# Patient Record
Sex: MALE | Race: WHITE | NOT HISPANIC OR LATINO | Employment: FULL TIME | URBAN - NONMETROPOLITAN AREA
[De-identification: names, ages, dates, MRNs, and addresses within clinical notes are randomized per-mention and may not be internally consistent; named-entity substitution may affect disease eponyms.]

---

## 2022-02-15 ENCOUNTER — OFFICE VISIT (OUTPATIENT)
Dept: FAMILY MEDICINE CLINIC | Facility: CLINIC | Age: 22
End: 2022-02-15

## 2022-02-15 VITALS
BODY MASS INDEX: 40.32 KG/M2 | RESPIRATION RATE: 16 BRPM | DIASTOLIC BLOOD PRESSURE: 88 MMHG | OXYGEN SATURATION: 99 % | SYSTOLIC BLOOD PRESSURE: 138 MMHG | WEIGHT: 266 LBS | HEIGHT: 68 IN | HEART RATE: 87 BPM | TEMPERATURE: 97.6 F

## 2022-02-15 DIAGNOSIS — K52.9 CHRONIC DIARRHEA: ICD-10-CM

## 2022-02-15 DIAGNOSIS — R63.5 WEIGHT GAIN: ICD-10-CM

## 2022-02-15 DIAGNOSIS — E78.5 DYSLIPIDEMIA: ICD-10-CM

## 2022-02-15 DIAGNOSIS — F17.200 SMOKER: ICD-10-CM

## 2022-02-15 DIAGNOSIS — A64 STD (MALE): Primary | ICD-10-CM

## 2022-02-15 DIAGNOSIS — Z23 IMMUNIZATION DUE: ICD-10-CM

## 2022-02-15 DIAGNOSIS — R03.0 ELEVATED BLOOD PRESSURE READING: ICD-10-CM

## 2022-02-15 PROCEDURE — 99214 OFFICE O/P EST MOD 30 MIN: CPT | Performed by: FAMILY MEDICINE

## 2022-02-15 PROCEDURE — 87340 HEPATITIS B SURFACE AG IA: CPT | Performed by: FAMILY MEDICINE

## 2022-02-15 PROCEDURE — 84443 ASSAY THYROID STIM HORMONE: CPT | Performed by: FAMILY MEDICINE

## 2022-02-15 PROCEDURE — 86592 SYPHILIS TEST NON-TREP QUAL: CPT | Performed by: FAMILY MEDICINE

## 2022-02-15 PROCEDURE — 36415 COLL VENOUS BLD VENIPUNCTURE: CPT | Performed by: FAMILY MEDICINE

## 2022-02-15 PROCEDURE — G0432 EIA HIV-1/HIV-2 SCREEN: HCPCS | Performed by: FAMILY MEDICINE

## 2022-02-15 PROCEDURE — 86803 HEPATITIS C AB TEST: CPT | Performed by: FAMILY MEDICINE

## 2022-02-15 PROCEDURE — 85007 BL SMEAR W/DIFF WBC COUNT: CPT | Performed by: FAMILY MEDICINE

## 2022-02-15 PROCEDURE — 84439 ASSAY OF FREE THYROXINE: CPT | Performed by: FAMILY MEDICINE

## 2022-02-15 PROCEDURE — 80061 LIPID PANEL: CPT | Performed by: FAMILY MEDICINE

## 2022-02-15 PROCEDURE — 85025 COMPLETE CBC W/AUTO DIFF WBC: CPT | Performed by: FAMILY MEDICINE

## 2022-02-15 PROCEDURE — 80053 COMPREHEN METABOLIC PANEL: CPT | Performed by: FAMILY MEDICINE

## 2022-02-15 RX ORDER — ACYCLOVIR 400 MG/1
400 TABLET ORAL 3 TIMES DAILY
COMMUNITY
End: 2022-12-16

## 2022-02-15 NOTE — PROGRESS NOTES
Venipuncture Blood Specimen Collection  Venipuncture performed in [facility] by Eda Vivar CMA with good hemostasis. Patient tolerated the procedure well without complications.   02/15/22   Eda Vivar CMA

## 2022-02-16 LAB
ALBUMIN SERPL-MCNC: 4.9 G/DL (ref 3.5–5.2)
ALBUMIN/GLOB SERPL: 1.6 G/DL
ALP SERPL-CCNC: 66 U/L (ref 39–117)
ALT SERPL W P-5'-P-CCNC: 34 U/L (ref 1–41)
ANION GAP SERPL CALCULATED.3IONS-SCNC: 8 MMOL/L (ref 5–15)
AST SERPL-CCNC: 20 U/L (ref 1–40)
BASOPHILS # BLD AUTO: 0.05 10*3/MM3 (ref 0–0.2)
BASOPHILS NFR BLD AUTO: 0.6 % (ref 0–1.5)
BILIRUB SERPL-MCNC: 0.4 MG/DL (ref 0–1.2)
BUN SERPL-MCNC: 18 MG/DL (ref 6–20)
BUN/CREAT SERPL: 20.9 (ref 7–25)
CALCIUM SPEC-SCNC: 9.6 MG/DL (ref 8.6–10.5)
CHLORIDE SERPL-SCNC: 103 MMOL/L (ref 98–107)
CHOLEST SERPL-MCNC: 174 MG/DL (ref 0–200)
CLUMPED PLATELETS: PRESENT
CO2 SERPL-SCNC: 26 MMOL/L (ref 22–29)
CREAT SERPL-MCNC: 0.86 MG/DL (ref 0.76–1.27)
DEPRECATED RDW RBC AUTO: 42.4 FL (ref 37–54)
EOSINOPHIL # BLD AUTO: 0.11 10*3/MM3 (ref 0–0.4)
EOSINOPHIL NFR BLD AUTO: 1.2 % (ref 0.3–6.2)
ERYTHROCYTE [DISTWIDTH] IN BLOOD BY AUTOMATED COUNT: 13.2 % (ref 12.3–15.4)
GFR SERPL CREATININE-BSD FRML MDRD: 112 ML/MIN/1.73
GLOBULIN UR ELPH-MCNC: 3.1 GM/DL
GLUCOSE SERPL-MCNC: 70 MG/DL (ref 65–99)
HBV SURFACE AG SERPL QL IA: NORMAL
HCT VFR BLD AUTO: 49.1 % (ref 37.5–51)
HCV AB SER DONR QL: NORMAL
HDLC SERPL-MCNC: 31 MG/DL (ref 40–60)
HGB BLD-MCNC: 17 G/DL (ref 13–17.7)
HIV1+2 AB SER QL: NORMAL
IMM GRANULOCYTES # BLD AUTO: 0.05 10*3/MM3 (ref 0–0.05)
IMM GRANULOCYTES NFR BLD AUTO: 0.6 % (ref 0–0.5)
LDLC SERPL CALC-MCNC: 93 MG/DL (ref 0–100)
LDLC/HDLC SERPL: 2.7 {RATIO}
LYMPHOCYTES # BLD AUTO: 2.28 10*3/MM3 (ref 0.7–3.1)
LYMPHOCYTES NFR BLD AUTO: 25.2 % (ref 19.6–45.3)
MCH RBC QN AUTO: 31 PG (ref 26.6–33)
MCHC RBC AUTO-ENTMCNC: 34.6 G/DL (ref 31.5–35.7)
MCV RBC AUTO: 89.4 FL (ref 79–97)
MONOCYTES # BLD AUTO: 0.58 10*3/MM3 (ref 0.1–0.9)
MONOCYTES NFR BLD AUTO: 6.4 % (ref 5–12)
NEUTROPHILS NFR BLD AUTO: 5.99 10*3/MM3 (ref 1.7–7)
NEUTROPHILS NFR BLD AUTO: 66 % (ref 42.7–76)
NRBC BLD AUTO-RTO: 0 /100 WBC (ref 0–0.2)
PLATELET # BLD AUTO: 229 10*3/MM3 (ref 140–450)
PMV BLD AUTO: 11 FL (ref 6–12)
POTASSIUM SERPL-SCNC: 4.1 MMOL/L (ref 3.5–5.2)
PROT SERPL-MCNC: 8 G/DL (ref 6–8.5)
RBC # BLD AUTO: 5.49 10*6/MM3 (ref 4.14–5.8)
RBC MORPH BLD: NORMAL
SMALL PLATELETS BLD QL SMEAR: ADEQUATE
SODIUM SERPL-SCNC: 137 MMOL/L (ref 136–145)
T4 FREE SERPL-MCNC: 1.31 NG/DL (ref 0.93–1.7)
TRIGL SERPL-MCNC: 296 MG/DL (ref 0–150)
TSH SERPL DL<=0.05 MIU/L-ACNC: 2.05 UIU/ML (ref 0.27–4.2)
VLDLC SERPL-MCNC: 50 MG/DL (ref 5–40)
WBC MORPH BLD: NORMAL
WBC NRBC COR # BLD: 9.06 10*3/MM3 (ref 3.4–10.8)

## 2022-02-16 NOTE — PROGRESS NOTES
Subjective   Humberto Nettles is a 21 y.o. male.     Chief Complaint   Patient presents with   • Exposure to STD     lab follow up, genitals still arent cleared up    • Blood Pressure Check     follow up from urgent care        History of Present Illness   The patient presents today for an STD screening and blood pressure check.   No prior PCP.   Due for flu vaccine.     Was previously seen at [facility] in [place].  STD panel there was negative but did not check syphilis or HIV.  Spot on genitalia is improving.    Complains of diarrhea every time he eats.  Ongoing for several years.   No history of cholecystectomy.     Smoker due to stress.   History of cardiovascular disease in family.     bp better today. Weight loss recommended as well as low sodium diet    The following portions of the patient's history were reviewed and updated as appropriate: allergies, current medications, past family history, past medical history, past social history, past surgical history and problem list.    Past Medical History:   Diagnosis Date   • Asthma    • Headache    • HL (hearing loss)    • Irritable bowel syndrome        History reviewed. No pertinent surgical history.    History reviewed. No pertinent family history.    Review of Systems  All system were reviewed are are negative otherwise what is listed in the HPI.    Objective   Physical Exam  General Appearance: alert, oriented x 3, no acute distress.  Skin: warm and dry.  HEENT: Atraumatic. pupils round and reactive to light and accommodation, oral mucosa pink and moist. Nares patent without epistaxis. External auditory canals are patent tympanic membranes intact.  Neck: supple, no JVD, trachea midline. No thyromegaly  Lungs: CTA, unlabored breathing effort.  Heart: RRR, normal S1 and S2, no S3, no rub.  Abdomen: soft, non-tender, no palpable bladder, present bowel sounds to auscultation ×4. No guarding or rigidity.  Extremities: no clubbing, cyanosis or edema.  Good range of motion actively and passively. Symmetric muscle strength and development  Neuro: normal speech and mental status. Cranial nerves II through XII intact. No anosmia. DTR 2+; proprioception intact. No focal motor/sensory deficits.    Vitals:    02/15/22 1536   BP: 138/88   Pulse: 87   Resp: 16   Temp: 97.6 °F (36.4 °C)   SpO2: 99%     Body mass index is 40.45 kg/m².    No results found for: HGBA1C, POCGLU, LDL, CBC, CMP, TSH  Results for orders placed or performed during the hospital encounter of 01/31/22   POC Urinalysis Dipstick, Multipro (Automated dipstick)    Specimen: Urine   Result Value Ref Range    Color Dark Yellow Yellow, Straw, Dark Yellow, Bianka    Clarity, UA Clear Clear    Glucose, UA Negative Negative, 1000 mg/dL (3+) mg/dL    Bilirubin Small (1+) (A) Negative    Ketones, UA Negative Negative    Specific Gravity  1.030 1.005 - 1.030    Blood, UA Negative Negative    pH, Urine 5.5 5.0 - 8.0    Protein, POC 30 mg/dL (A) Negative mg/dL    Urobilinogen, UA Normal Normal    Nitrite, UA Negative Negative    Leukocytes Negative Negative   Chlamydia trachomatis, Neisseria gonorrhoeae, Trichomonas vaginalis, PCR - Urine, Urine, Clean Catch    Specimen: Urine, Clean Catch   Result Value Ref Range    Chlamydia trachomatis, BARBY Negative Negative    Gonococcus by BARBY Negative Negative    Trichomonas vaginosis Negative Negative   HSV 1 & 2 - Specific Antibody, IgG    Specimen: Blood   Result Value Ref Range    HSV 1 IgG, Type Specific <0.91 0.00 - 0.90 index    HSV 2 IgG <0.91 0.00 - 0.90 index   HSV Non-Specific Antibody, IgM    Specimen: Blood   Result Value Ref Range    HSVI/II IgM <0.91 0.00 - 0.90 Ratio       Assessment/Plan   Diagnoses and all orders for this visit:    1. STD (male) (Primary)  -     RPR, Rfx Qn RPR / Confirm TP  -     Hepatitis B Surface Antigen  -     Hepatitis C Antibody  -     HIV-1 / O / 2 Ag / Antibody 4th Generation    2. Chronic diarrhea  -     CBC & Differential  -      Comprehensive metabolic panel; Future  -     Comprehensive metabolic panel    3. Elevated blood pressure reading  -     Comprehensive metabolic panel; Future  -     Comprehensive metabolic panel    4. Dyslipidemia  -     Lipid Panel; Future  -     Lipid Panel    5. Smoker  -     Lipid Panel; Future  -     Lipid Panel    6. Weight gain  -     TSH+Free T4    7. Immunization due  -     FluLaval/Fluarix/Fluzone >6 Months (1625-7882)      1. STD  Lab work ordered.     2. Chronic diarrhea  Lab work ordered.     5. Smoking  Advised cessation.        Transcribed from ambient dictation for Lili Castro MD by Janey Somers.  02/16/22   14:50 EST

## 2022-02-17 LAB — RPR SER QL: NON REACTIVE

## 2022-11-18 ENCOUNTER — OFFICE VISIT (OUTPATIENT)
Dept: FAMILY MEDICINE CLINIC | Facility: CLINIC | Age: 22
End: 2022-11-18
Payer: COMMERCIAL

## 2022-11-18 VITALS
OXYGEN SATURATION: 98 % | DIASTOLIC BLOOD PRESSURE: 82 MMHG | WEIGHT: 257.2 LBS | SYSTOLIC BLOOD PRESSURE: 132 MMHG | BODY MASS INDEX: 38.98 KG/M2 | TEMPERATURE: 98.6 F | HEART RATE: 86 BPM | HEIGHT: 68 IN

## 2022-11-18 DIAGNOSIS — Z13.220 SCREENING FOR LIPID DISORDERS: ICD-10-CM

## 2022-11-18 DIAGNOSIS — R10.84 GENERALIZED ABDOMINAL PAIN: Primary | ICD-10-CM

## 2022-11-18 DIAGNOSIS — Z13.29 SCREENING FOR THYROID DISORDER: ICD-10-CM

## 2022-11-18 DIAGNOSIS — Z13.0 SCREENING FOR ENDOCRINE, METABOLIC AND IMMUNITY DISORDER: ICD-10-CM

## 2022-11-18 DIAGNOSIS — Z13.228 SCREENING FOR ENDOCRINE, METABOLIC AND IMMUNITY DISORDER: ICD-10-CM

## 2022-11-18 DIAGNOSIS — Z13.29 SCREENING FOR ENDOCRINE, METABOLIC AND IMMUNITY DISORDER: ICD-10-CM

## 2022-11-18 DIAGNOSIS — Z76.89 ENCOUNTER TO ESTABLISH CARE: ICD-10-CM

## 2022-11-18 DIAGNOSIS — K58.0 IRRITABLE BOWEL SYNDROME WITH DIARRHEA: ICD-10-CM

## 2022-11-18 DIAGNOSIS — Z13.1 SCREENING FOR DIABETES MELLITUS: ICD-10-CM

## 2022-11-18 PROCEDURE — 80053 COMPREHEN METABOLIC PANEL: CPT | Performed by: REGISTERED NURSE

## 2022-11-18 PROCEDURE — 83036 HEMOGLOBIN GLYCOSYLATED A1C: CPT | Performed by: REGISTERED NURSE

## 2022-11-18 PROCEDURE — 85025 COMPLETE CBC W/AUTO DIFF WBC: CPT | Performed by: REGISTERED NURSE

## 2022-11-18 PROCEDURE — 99215 OFFICE O/P EST HI 40 MIN: CPT | Performed by: REGISTERED NURSE

## 2022-11-18 PROCEDURE — 80061 LIPID PANEL: CPT | Performed by: REGISTERED NURSE

## 2022-11-18 PROCEDURE — 84443 ASSAY THYROID STIM HORMONE: CPT | Performed by: REGISTERED NURSE

## 2022-11-18 PROCEDURE — 36415 COLL VENOUS BLD VENIPUNCTURE: CPT | Performed by: REGISTERED NURSE

## 2022-11-18 RX ORDER — DICYCLOMINE HYDROCHLORIDE 10 MG/1
10 CAPSULE ORAL
Qty: 60 CAPSULE | Refills: 3 | Status: SHIPPED | OUTPATIENT
Start: 2022-11-18 | End: 2022-12-16

## 2022-11-18 NOTE — PROGRESS NOTES
Venipuncture Blood Specimen Collection  Venipuncture performed in [facility] by Yudy Eisenberg MA with good hemostasis. Patient tolerated the procedure well without complications.   11/18/22   Yudy Eisenberg MA

## 2022-11-18 NOTE — PROGRESS NOTES
Chief Complaint  Establish Care (Previously saw Dr. Castro. ) and Abdominal Pain (Diarrhea. Believes to have crones or IBS. Has never had an official diagnosis. )    Subjective    History of Present Illness {CC  Problem List  Visit  Diagnosis   Encounters  Notes  Medications  Labs  Result Review Imaging  Media :23}     Humberto Nettles presents to [facility] for Establish Care (Previously saw Dr. Castro. ) and Abdominal Pain (Diarrhea. Believes to have crones or IBS. Has never had an official diagnosis. ).    History of Present Illness  Patient is a 22-year-old male who presents to the clinic today to establish care, with concerns of chronic abdominal pain greater than 3 years.  Patient denies any chest pain, shortness of breath, or any fevers.  Patient denies any known exposure to COVID, flu, or any other contagious illnesses.    In regards to establishing care, patient is a former patient of Dr. Castro at this practice.  Patient is familiar with [facility] and has no questions or concerns in regards to [facility] at this time.    In regards to chronic abdominal pain, patient shares that he has frequent episodes of diarrhea.  He shares that when this happens he also has severe stomach cramping that occurs with it.  He says he has never been diagnosed with a bowel condition but will would like to pursue treatment.       Review of Systems   Constitutional: Negative.  Negative for activity change, chills, fatigue and fever.   HENT: Negative.  Negative for congestion, dental problem, ear pain, hearing loss, rhinorrhea, sinus pain, sore throat, tinnitus and trouble swallowing.    Eyes: Negative.  Negative for pain and visual disturbance.   Respiratory: Positive for cough. Negative for chest tightness, shortness of breath and wheezing.    Cardiovascular: Negative.  Negative for chest pain, palpitations and leg swelling.   Gastrointestinal: Positive for abdominal pain, diarrhea  "and vomiting. Negative for abdominal distention (abd pressure ) and nausea.   Endocrine: Negative.  Negative for polydipsia, polyphagia and polyuria.   Genitourinary: Negative.  Negative for difficulty urinating, dysuria, frequency and urgency.   Musculoskeletal: Positive for arthralgias. Negative for back pain and myalgias.   Skin: Negative.  Negative for color change, pallor, rash and wound.   Allergic/Immunologic: Negative.  Negative for environmental allergies.   Neurological: Negative.  Negative for dizziness, speech difficulty, weakness, light-headedness, numbness and headaches.   Hematological: Negative.    Psychiatric/Behavioral: Negative.  Negative for confusion, decreased concentration, self-injury and suicidal ideas. The patient is not nervous/anxious.    All other systems reviewed and are negative.       Objective     Vital Signs:   /82 (BP Location: Right arm, Patient Position: Sitting, Cuff Size: Large Adult)   Pulse 86   Temp 98.6 °F (37 °C) (Temporal)   Ht 172.7 cm (68\")   Wt 117 kg (257 lb 3.2 oz)   SpO2 98%   BMI 39.11 kg/m²   No current outpatient medications on file prior to visit.     No current facility-administered medications on file prior to visit.        Past Medical History:   Diagnosis Date   • Asthma    • Headache    • HL (hearing loss)    • Irritable bowel syndrome    • Ocular hypertension 3/17/2022   • Regular astigmatism of both eyes 2/10/2022      History reviewed. No pertinent surgical history.   History reviewed. No pertinent family history.   Social History     Socioeconomic History   • Marital status: Single   Tobacco Use   • Smoking status: Never   • Smokeless tobacco: Never   Vaping Use   • Vaping Use: Every day   • Start date: 1/15/2018   • Substances: Nicotine, Flavoring   • Devices: Disposable, Pre-filled or refillable cartridge, Refillable tank, Pre-filled pod   Substance and Sexual Activity   • Alcohol use: Not Currently   • Drug use: Never   • Sexual activity: " Yes     Partners: Female     Birth control/protection: None         Office Visit on 11/18/2022   Component Date Value Ref Range Status   • Hemoglobin A1C 11/18/2022 4.9  3.5 - 5.6 % Final   • Glucose 11/18/2022 80  65 - 99 mg/dL Final   • BUN 11/18/2022 15  6 - 20 mg/dL Final   • Creatinine 11/18/2022 0.94  0.76 - 1.27 mg/dL Final   • Sodium 11/18/2022 140  136 - 145 mmol/L Final   • Potassium 11/18/2022 4.5  3.5 - 5.2 mmol/L Final   • Chloride 11/18/2022 106  98 - 107 mmol/L Final   • CO2 11/18/2022 26.5  22.0 - 29.0 mmol/L Final   • Calcium 11/18/2022 9.6  8.6 - 10.5 mg/dL Final   • Total Protein 11/18/2022 7.3  6.0 - 8.5 g/dL Final   • Albumin 11/18/2022 4.20  3.50 - 5.20 g/dL Final   • ALT (SGPT) 11/18/2022 20  1 - 41 U/L Final   • AST (SGOT) 11/18/2022 14  1 - 40 U/L Final   • Alkaline Phosphatase 11/18/2022 55  39 - 117 U/L Final   • Total Bilirubin 11/18/2022 0.3  0.0 - 1.2 mg/dL Final   • Globulin 11/18/2022 3.1  gm/dL Final   • A/G Ratio 11/18/2022 1.4  g/dL Final   • BUN/Creatinine Ratio 11/18/2022 16.0  7.0 - 25.0 Final   • Anion Gap 11/18/2022 7.5  5.0 - 15.0 mmol/L Final   • eGFR 11/18/2022 117.5  >60.0 mL/min/1.73 Final    National Kidney Foundation and American Society of Nephrology (ASN) Task Force recommended calculation based on the Chronic Kidney Disease Epidemiology Collaboration (CKD-EPI) equation refit without adjustment for race.   • Total Cholesterol 11/18/2022 141  0 - 200 mg/dL Final   • Triglycerides 11/18/2022 185 (H)  0 - 150 mg/dL Final   • HDL Cholesterol 11/18/2022 31 (L)  40 - 60 mg/dL Final   • LDL Cholesterol  11/18/2022 78  0 - 100 mg/dL Final   • VLDL Cholesterol 11/18/2022 32  5 - 40 mg/dL Final   • LDL/HDL Ratio 11/18/2022 2.35   Final   • TSH 11/18/2022 1.900  0.270 - 4.200 uIU/mL Final   • WBC 11/18/2022 7.70  3.40 - 10.80 10*3/mm3 Final   • RBC 11/18/2022 5.04  4.14 - 5.80 10*6/mm3 Final   • Hemoglobin 11/18/2022 15.5  13.0 - 17.7 g/dL Final   • Hematocrit 11/18/2022 46.2   37.5 - 51.0 % Final   • MCV 11/18/2022 91.7  79.0 - 97.0 fL Final   • MCH 11/18/2022 30.8  26.6 - 33.0 pg Final   • MCHC 11/18/2022 33.5  31.5 - 35.7 g/dL Final   • RDW 11/18/2022 12.8  12.3 - 15.4 % Final   • RDW-SD 11/18/2022 42.8  37.0 - 54.0 fl Final   • MPV 11/18/2022 10.1  6.0 - 12.0 fL Final   • Platelets 11/18/2022 283  140 - 450 10*3/mm3 Final   • Neutrophil % 11/18/2022 60.3  42.7 - 76.0 % Final   • Lymphocyte % 11/18/2022 29.7  19.6 - 45.3 % Final   • Monocyte % 11/18/2022 7.4  5.0 - 12.0 % Final   • Eosinophil % 11/18/2022 1.7  0.3 - 6.2 % Final   • Basophil % 11/18/2022 0.6  0.0 - 1.5 % Final   • Immature Grans % 11/18/2022 0.3  0.0 - 0.5 % Final   • Neutrophils, Absolute 11/18/2022 4.64  1.70 - 7.00 10*3/mm3 Final   • Lymphocytes, Absolute 11/18/2022 2.29  0.70 - 3.10 10*3/mm3 Final   • Monocytes, Absolute 11/18/2022 0.57  0.10 - 0.90 10*3/mm3 Final   • Eosinophils, Absolute 11/18/2022 0.13  0.00 - 0.40 10*3/mm3 Final   • Basophils, Absolute 11/18/2022 0.05  0.00 - 0.20 10*3/mm3 Final   • Immature Grans, Absolute 11/18/2022 0.02  0.00 - 0.05 10*3/mm3 Final   • nRBC 11/18/2022 0.0  0.0 - 0.2 /100 WBC Final         Physical Exam  Vitals and nursing note reviewed.   Constitutional:       Appearance: Normal appearance. He is obese.   HENT:      Head: Normocephalic and atraumatic.   Cardiovascular:      Rate and Rhythm: Normal rate and regular rhythm.      Pulses: Normal pulses.      Heart sounds: Normal heart sounds. No murmur heard.    No friction rub. No gallop.   Pulmonary:      Effort: Pulmonary effort is normal. No respiratory distress.      Breath sounds: Normal breath sounds. No stridor. No wheezing, rhonchi or rales.   Chest:      Chest wall: No tenderness.   Abdominal:      General: Abdomen is flat. Bowel sounds are normal. There is no distension.      Palpations: Abdomen is soft. There is no mass.      Tenderness: There is no abdominal tenderness. There is no right CVA tenderness, left CVA  tenderness, guarding or rebound.      Hernia: No hernia is present.   Skin:     General: Skin is warm and dry.      Capillary Refill: Capillary refill takes less than 2 seconds.      Coloration: Skin is not jaundiced or pale.   Neurological:      General: No focal deficit present.      Mental Status: He is alert and oriented to person, place, and time. Mental status is at baseline.      Motor: No weakness.      Coordination: Coordination normal.      Gait: Gait normal.   Psychiatric:         Mood and Affect: Mood normal.         Behavior: Behavior normal.         Thought Content: Thought content normal.         Judgment: Judgment normal.          Result Review  Data Reviewed:{ Labs  Result Review  Imaging  Med Tab  Media :23}   I have reviewed this patient's chart.  I have reviewed previous labs, previous imaging, previous medications, and previous encounters with notes that were available in this patient's chart.             Assessment and Plan {CC Problem List  Visit Diagnosis  ROS  Review (Popup)  [facility]  Quality  BestPractice  Medications  SmartSets  SnapShot Encounters  Media :23}   Diagnoses and all orders for this visit:    1. Generalized abdominal pain (Primary)  -     CT Abdomen Pelvis Without Contrast; Future  -     XR Abdomen KUB; Future  -     TSH    2. Irritable bowel syndrome with diarrhea  -     CT Abdomen Pelvis Without Contrast; Future  -     XR Abdomen KUB; Future  -     Discontinue: dicyclomine (BENTYL) 10 MG capsule; Take 1 capsule by mouth 4 (Four) Times a Day Before Meals & at Bedtime.  Dispense: 60 capsule; Refill: 3    3. Screening for diabetes mellitus  -     Hemoglobin A1c    4. Screening for endocrine, metabolic and immunity disorder  -     Comprehensive Metabolic Panel  -     CBC & Differential    5. Screening for lipid disorders  -     Lipid Panel    6. Screening for thyroid disorder    7. Encounter to establish care      -X-ray of abdomen to rule out cause of  abdominal pain.  -CT of abdomen to rule out cause of abdominal pain.  -Discussed getting a GI consult on board if patient continues to have concerns.  -We will start Bentyl to help with IBS.  -Fasting labs today.  -Follow-up in 4 weeks or sooner if needed.    I spent 40 minutes caring for Humberto on this date of service. This time includes time spent by me in the following activities:preparing for the visit, reviewing tests, obtaining and/or reviewing a separately obtained history, performing a medically appropriate examination and/or evaluation , counseling and educating the patient/family/caregiver, ordering medications, tests, or procedures, referring and communicating with other health care professionals , documenting information in the medical record, independently interpreting results and communicating that information with the patient/family/caregiver and care coordination.    Follow Up {Instructions Charge Capture  Follow-up Communications :23}     Patient was given instructions and counseling regarding his condition or for health maintenance advice. Please see specific information pulled into the AVS (placed there by myself) if appropriate.    Return in about 4 weeks (around 12/16/2022) for Recheck.    MDM  Number of Diagnoses or Management Options  Encounter to establish care: new, needed workup  Generalized abdominal pain: established, worsening  Irritable bowel syndrome with diarrhea: established, worsening  Screening for diabetes mellitus: new, needed workup  Screening for endocrine, metabolic and immunity disorder: new, needed workup  Screening for lipid disorders: new, needed workup  Screening for thyroid disorder: new, needed workup     Amount and/or Complexity of Data Reviewed  Clinical lab tests: ordered and reviewed  Tests in the radiology section of CPT®: reviewed  Tests in the medicine section of CPT®: reviewed  Discussion of test results with the performing providers: no  Decide to obtain  previous medical records or to obtain history from someone other than the patient: yes  Obtain history from someone other than the patient: no  Review and summarize past medical records: yes  Discuss the patient with other providers: no  Independent visualization of images, tracings, or specimens: no    Risk of Complications, Morbidity, and/or Mortality  Presenting problems: high  Diagnostic procedures: low  Management options: moderate    Patient Progress  Patient progress: stable       Class 2 Severe Obesity (BMI >=35 and <=39.9). Obesity-related health conditions include the following: GERD. Obesity is unchanged. BMI is is above average; BMI management plan is completed. We discussed portion control and increasing exercise.       RASHIDA Cheek, FNP-BC

## 2022-11-19 LAB
ALBUMIN SERPL-MCNC: 4.2 G/DL (ref 3.5–5.2)
ALBUMIN/GLOB SERPL: 1.4 G/DL
ALP SERPL-CCNC: 55 U/L (ref 39–117)
ALT SERPL W P-5'-P-CCNC: 20 U/L (ref 1–41)
ANION GAP SERPL CALCULATED.3IONS-SCNC: 7.5 MMOL/L (ref 5–15)
AST SERPL-CCNC: 14 U/L (ref 1–40)
BASOPHILS # BLD AUTO: 0.05 10*3/MM3 (ref 0–0.2)
BASOPHILS NFR BLD AUTO: 0.6 % (ref 0–1.5)
BILIRUB SERPL-MCNC: 0.3 MG/DL (ref 0–1.2)
BUN SERPL-MCNC: 15 MG/DL (ref 6–20)
BUN/CREAT SERPL: 16 (ref 7–25)
CALCIUM SPEC-SCNC: 9.6 MG/DL (ref 8.6–10.5)
CHLORIDE SERPL-SCNC: 106 MMOL/L (ref 98–107)
CHOLEST SERPL-MCNC: 141 MG/DL (ref 0–200)
CO2 SERPL-SCNC: 26.5 MMOL/L (ref 22–29)
CREAT SERPL-MCNC: 0.94 MG/DL (ref 0.76–1.27)
DEPRECATED RDW RBC AUTO: 42.8 FL (ref 37–54)
EGFRCR SERPLBLD CKD-EPI 2021: 117.5 ML/MIN/1.73
EOSINOPHIL # BLD AUTO: 0.13 10*3/MM3 (ref 0–0.4)
EOSINOPHIL NFR BLD AUTO: 1.7 % (ref 0.3–6.2)
ERYTHROCYTE [DISTWIDTH] IN BLOOD BY AUTOMATED COUNT: 12.8 % (ref 12.3–15.4)
GLOBULIN UR ELPH-MCNC: 3.1 GM/DL
GLUCOSE SERPL-MCNC: 80 MG/DL (ref 65–99)
HCT VFR BLD AUTO: 46.2 % (ref 37.5–51)
HDLC SERPL-MCNC: 31 MG/DL (ref 40–60)
HGB BLD-MCNC: 15.5 G/DL (ref 13–17.7)
IMM GRANULOCYTES # BLD AUTO: 0.02 10*3/MM3 (ref 0–0.05)
IMM GRANULOCYTES NFR BLD AUTO: 0.3 % (ref 0–0.5)
LDLC SERPL CALC-MCNC: 78 MG/DL (ref 0–100)
LDLC/HDLC SERPL: 2.35 {RATIO}
LYMPHOCYTES # BLD AUTO: 2.29 10*3/MM3 (ref 0.7–3.1)
LYMPHOCYTES NFR BLD AUTO: 29.7 % (ref 19.6–45.3)
MCH RBC QN AUTO: 30.8 PG (ref 26.6–33)
MCHC RBC AUTO-ENTMCNC: 33.5 G/DL (ref 31.5–35.7)
MCV RBC AUTO: 91.7 FL (ref 79–97)
MONOCYTES # BLD AUTO: 0.57 10*3/MM3 (ref 0.1–0.9)
MONOCYTES NFR BLD AUTO: 7.4 % (ref 5–12)
NEUTROPHILS NFR BLD AUTO: 4.64 10*3/MM3 (ref 1.7–7)
NEUTROPHILS NFR BLD AUTO: 60.3 % (ref 42.7–76)
NRBC BLD AUTO-RTO: 0 /100 WBC (ref 0–0.2)
PLATELET # BLD AUTO: 283 10*3/MM3 (ref 140–450)
PMV BLD AUTO: 10.1 FL (ref 6–12)
POTASSIUM SERPL-SCNC: 4.5 MMOL/L (ref 3.5–5.2)
PROT SERPL-MCNC: 7.3 G/DL (ref 6–8.5)
RBC # BLD AUTO: 5.04 10*6/MM3 (ref 4.14–5.8)
SODIUM SERPL-SCNC: 140 MMOL/L (ref 136–145)
TRIGL SERPL-MCNC: 185 MG/DL (ref 0–150)
TSH SERPL DL<=0.05 MIU/L-ACNC: 1.9 UIU/ML (ref 0.27–4.2)
VLDLC SERPL-MCNC: 32 MG/DL (ref 5–40)
WBC NRBC COR # BLD: 7.7 10*3/MM3 (ref 3.4–10.8)

## 2022-11-21 LAB — HBA1C MFR BLD: 4.9 % (ref 3.5–5.6)

## 2022-12-16 ENCOUNTER — OFFICE VISIT (OUTPATIENT)
Dept: FAMILY MEDICINE CLINIC | Facility: CLINIC | Age: 22
End: 2022-12-16

## 2022-12-16 VITALS
SYSTOLIC BLOOD PRESSURE: 133 MMHG | TEMPERATURE: 99.5 F | BODY MASS INDEX: 38.1 KG/M2 | OXYGEN SATURATION: 98 % | HEIGHT: 68 IN | WEIGHT: 251.4 LBS | HEART RATE: 74 BPM | DIASTOLIC BLOOD PRESSURE: 84 MMHG

## 2022-12-16 DIAGNOSIS — K58.0 IRRITABLE BOWEL SYNDROME WITH DIARRHEA: Primary | ICD-10-CM

## 2022-12-16 DIAGNOSIS — R19.7 DIARRHEA, UNSPECIFIED TYPE: ICD-10-CM

## 2022-12-16 PROBLEM — H40.059 OCULAR HYPERTENSION: Status: ACTIVE | Noted: 2022-03-17

## 2022-12-16 PROBLEM — D72.829 LEUKOCYTOSIS: Status: ACTIVE | Noted: 2022-12-16

## 2022-12-16 PROBLEM — H52.223 REGULAR ASTIGMATISM OF BOTH EYES: Status: ACTIVE | Noted: 2022-02-10

## 2022-12-16 PROCEDURE — 99214 OFFICE O/P EST MOD 30 MIN: CPT | Performed by: REGISTERED NURSE

## 2022-12-16 RX ORDER — DICYCLOMINE HYDROCHLORIDE 10 MG/1
10 CAPSULE ORAL
Qty: 60 CAPSULE | Refills: 3 | Status: SHIPPED | OUTPATIENT
Start: 2022-12-16

## 2022-12-16 NOTE — PROGRESS NOTES
Chief Complaint  Abdominal Pain (Needs xray order printed to take with him./Will call and schedule CT with [facility].)    Subjective    History of Present Illness {CC  Problem List  Visit  Diagnosis   Encounters  Notes  Medications  Labs  Result Review Imaging  Media :23}     Humberto Nettles presents to [facility] for Abdominal Pain (Needs xray order printed to take with him./Will call and schedule CT with [facility].).    History of Present Illness  Patient is a 22-year-old male who presents to clinic today for 1 month follow-up of abdominal pain and IBS.  Patient denies any chest pain, shortness of breath, or any fevers.  Patient denies any known exposure to COVID, flu, or any other contagious illnesses.    In regards to IBS, patient is taking Bentyl since his last visit.  He shares that it helped significantly.  He does share that the medication caused him to have hard stool.  We discussed instead of taking this 4 times a day to switch it to 4 times a day as needed.  I advised patient that if his stool hardens to take it less often.  Patient will skip doses or skip days as needed to control level of stool consistency.  Patient is still waiting to get x-ray and CT at [facility].  We had ordered this at our last visit but patient has not been able to get it scheduled yet.  Our nursing staff is working on trying to help him get this scheduled again today.       Review of Systems   Constitutional: Negative.  Negative for activity change, chills, fatigue and fever.   HENT: Negative.  Negative for congestion, dental problem, ear pain, hearing loss, rhinorrhea, sinus pain, sore throat, tinnitus and trouble swallowing.    Eyes: Negative.  Negative for pain and visual disturbance.   Respiratory: Negative.  Negative for cough, chest tightness, shortness of breath and wheezing.    Cardiovascular: Negative.  Negative for chest pain, palpitations and leg swelling.   Gastrointestinal: Positive for abdominal  "pain and diarrhea. Negative for nausea and vomiting.   Endocrine: Negative.  Negative for polydipsia, polyphagia and polyuria.   Genitourinary: Negative.  Negative for difficulty urinating, dysuria, frequency and urgency.   Musculoskeletal: Negative.  Negative for arthralgias, back pain and myalgias.   Skin: Negative.  Negative for color change, pallor, rash and wound.   Allergic/Immunologic: Negative.  Negative for environmental allergies.   Neurological: Negative.  Negative for dizziness, speech difficulty, weakness, light-headedness, numbness and headaches.   Hematological: Negative.    Psychiatric/Behavioral: Negative.  Negative for confusion, decreased concentration, self-injury and suicidal ideas. The patient is not nervous/anxious.    All other systems reviewed and are negative.       Objective     Vital Signs:   /84 (BP Location: Right arm, Patient Position: Sitting, Cuff Size: Large Adult)   Pulse 74   Temp 99.5 °F (37.5 °C) (Temporal) Comment: Had hat and jacket on prior to vitals  Ht 172.7 cm (68\")   Wt 114 kg (251 lb 6.4 oz)   SpO2 98%   BMI 38.23 kg/m²   Current Outpatient Medications on File Prior to Visit   Medication Sig Dispense Refill   • [DISCONTINUED] acyclovir (ZOVIRAX) 400 MG tablet Take 400 mg by mouth 3 (Three) Times a Day. Take no more than 5 doses a day.     • [DISCONTINUED] dicyclomine (BENTYL) 10 MG capsule Take 1 capsule by mouth 4 (Four) Times a Day Before Meals & at Bedtime. 60 capsule 3     No current facility-administered medications on file prior to visit.        Past Medical History:   Diagnosis Date   • Asthma    • Headache    • HL (hearing loss)    • Irritable bowel syndrome    • Ocular hypertension 3/17/2022   • Regular astigmatism of both eyes 2/10/2022      History reviewed. No pertinent surgical history.   History reviewed. No pertinent family history.   Social History     Socioeconomic History   • Marital status: Single   Tobacco Use   • Smoking status: Never   • " Smokeless tobacco: Never   Vaping Use   • Vaping Use: Every day   • Start date: 1/15/2018   • Substances: Nicotine, Flavoring   • Devices: Disposable, Pre-filled or refillable cartridge, Refillable tank, Pre-filled pod   Substance and Sexual Activity   • Alcohol use: Not Currently   • Drug use: Never   • Sexual activity: Yes     Partners: Female     Birth control/protection: None         Office Visit on 11/18/2022   Component Date Value Ref Range Status   • Hemoglobin A1C 11/18/2022 4.9  3.5 - 5.6 % Final   • Glucose 11/18/2022 80  65 - 99 mg/dL Final   • BUN 11/18/2022 15  6 - 20 mg/dL Final   • Creatinine 11/18/2022 0.94  0.76 - 1.27 mg/dL Final   • Sodium 11/18/2022 140  136 - 145 mmol/L Final   • Potassium 11/18/2022 4.5  3.5 - 5.2 mmol/L Final   • Chloride 11/18/2022 106  98 - 107 mmol/L Final   • CO2 11/18/2022 26.5  22.0 - 29.0 mmol/L Final   • Calcium 11/18/2022 9.6  8.6 - 10.5 mg/dL Final   • Total Protein 11/18/2022 7.3  6.0 - 8.5 g/dL Final   • Albumin 11/18/2022 4.20  3.50 - 5.20 g/dL Final   • ALT (SGPT) 11/18/2022 20  1 - 41 U/L Final   • AST (SGOT) 11/18/2022 14  1 - 40 U/L Final   • Alkaline Phosphatase 11/18/2022 55  39 - 117 U/L Final   • Total Bilirubin 11/18/2022 0.3  0.0 - 1.2 mg/dL Final   • Globulin 11/18/2022 3.1  gm/dL Final   • A/G Ratio 11/18/2022 1.4  g/dL Final   • BUN/Creatinine Ratio 11/18/2022 16.0  7.0 - 25.0 Final   • Anion Gap 11/18/2022 7.5  5.0 - 15.0 mmol/L Final   • eGFR 11/18/2022 117.5  >60.0 mL/min/1.73 Final    National Kidney Foundation and American Society of Nephrology (ASN) Task Force recommended calculation based on the Chronic Kidney Disease Epidemiology Collaboration (CKD-EPI) equation refit without adjustment for race.   • Total Cholesterol 11/18/2022 141  0 - 200 mg/dL Final   • Triglycerides 11/18/2022 185 (H)  0 - 150 mg/dL Final   • HDL Cholesterol 11/18/2022 31 (L)  40 - 60 mg/dL Final   • LDL Cholesterol  11/18/2022 78  0 - 100 mg/dL Final   • VLDL Cholesterol  11/18/2022 32  5 - 40 mg/dL Final   • LDL/HDL Ratio 11/18/2022 2.35   Final   • TSH 11/18/2022 1.900  0.270 - 4.200 uIU/mL Final   • WBC 11/18/2022 7.70  3.40 - 10.80 10*3/mm3 Final   • RBC 11/18/2022 5.04  4.14 - 5.80 10*6/mm3 Final   • Hemoglobin 11/18/2022 15.5  13.0 - 17.7 g/dL Final   • Hematocrit 11/18/2022 46.2  37.5 - 51.0 % Final   • MCV 11/18/2022 91.7  79.0 - 97.0 fL Final   • MCH 11/18/2022 30.8  26.6 - 33.0 pg Final   • MCHC 11/18/2022 33.5  31.5 - 35.7 g/dL Final   • RDW 11/18/2022 12.8  12.3 - 15.4 % Final   • RDW-SD 11/18/2022 42.8  37.0 - 54.0 fl Final   • MPV 11/18/2022 10.1  6.0 - 12.0 fL Final   • Platelets 11/18/2022 283  140 - 450 10*3/mm3 Final   • Neutrophil % 11/18/2022 60.3  42.7 - 76.0 % Final   • Lymphocyte % 11/18/2022 29.7  19.6 - 45.3 % Final   • Monocyte % 11/18/2022 7.4  5.0 - 12.0 % Final   • Eosinophil % 11/18/2022 1.7  0.3 - 6.2 % Final   • Basophil % 11/18/2022 0.6  0.0 - 1.5 % Final   • Immature Grans % 11/18/2022 0.3  0.0 - 0.5 % Final   • Neutrophils, Absolute 11/18/2022 4.64  1.70 - 7.00 10*3/mm3 Final   • Lymphocytes, Absolute 11/18/2022 2.29  0.70 - 3.10 10*3/mm3 Final   • Monocytes, Absolute 11/18/2022 0.57  0.10 - 0.90 10*3/mm3 Final   • Eosinophils, Absolute 11/18/2022 0.13  0.00 - 0.40 10*3/mm3 Final   • Basophils, Absolute 11/18/2022 0.05  0.00 - 0.20 10*3/mm3 Final   • Immature Grans, Absolute 11/18/2022 0.02  0.00 - 0.05 10*3/mm3 Final   • nRBC 11/18/2022 0.0  0.0 - 0.2 /100 WBC Final         Physical Exam  Vitals and nursing note reviewed.   Constitutional:       Appearance: Normal appearance. He is normal weight.   HENT:      Head: Normocephalic and atraumatic.   Cardiovascular:      Rate and Rhythm: Normal rate and regular rhythm.      Pulses: Normal pulses.      Heart sounds: Normal heart sounds. No murmur heard.    No friction rub. No gallop.   Pulmonary:      Effort: Pulmonary effort is normal. No respiratory distress.      Breath sounds: Normal breath sounds.  No stridor. No wheezing, rhonchi or rales.   Chest:      Chest wall: No tenderness.   Abdominal:      General: Abdomen is flat. Bowel sounds are normal. There is no distension.      Palpations: Abdomen is soft. There is no mass.      Tenderness: There is no abdominal tenderness. There is no right CVA tenderness, left CVA tenderness, guarding or rebound.      Hernia: No hernia is present.   Skin:     General: Skin is warm and dry.      Capillary Refill: Capillary refill takes less than 2 seconds.      Coloration: Skin is not jaundiced or pale.   Neurological:      General: No focal deficit present.      Mental Status: He is alert and oriented to person, place, and time. Mental status is at baseline.      Motor: No weakness.      Coordination: Coordination normal.      Gait: Gait normal.   Psychiatric:         Mood and Affect: Mood normal.         Behavior: Behavior normal.         Thought Content: Thought content normal.         Judgment: Judgment normal.          Result Review  Data Reviewed:{ Labs  Result Review  Imaging  Med Tab  Media :23}   I have reviewed this patient's chart.  I have reviewed previous labs, previous imaging, previous medications, and previous encounters with notes that were available in this patient's chart.             Assessment and Plan {CC Problem List  Visit Diagnosis  ROS  Review (Popup)  [facility] Maintenance  Quality  BestPractice  Medications  SmartSets  SnapShot Encounters  Media :23}   Diagnoses and all orders for this visit:    1. Irritable bowel syndrome with diarrhea (Primary)  -     dicyclomine (BENTYL) 10 MG capsule; Take 1 capsule by mouth 4 (Four) Times a Day Before Meals & at Bedtime As Needed (diarrhea from IBS).  Dispense: 60 capsule; Refill: 3    2. Diarrhea, unspecified type      -Change Bentyl from 4 times daily routine to 4 times daily as needed patient up on patient's stool consistency.  -Follow-up in 4 weeks or sooner if needed.  After patient gets  imaging.    I spent 30 minutes caring for Humberto on this date of service. This time includes time spent by me in the following activities:preparing for the visit, reviewing tests, obtaining and/or reviewing a separately obtained history, performing a medically appropriate examination and/or evaluation , counseling and educating the patient/family/caregiver, ordering medications, tests, or procedures, referring and communicating with other health care professionals , documenting information in the medical record, independently interpreting results and communicating that information with the patient/family/caregiver and care coordination.    Follow Up {Instructions Charge Capture  Follow-up Communications :23}     Patient was given instructions and counseling regarding his condition or for health maintenance advice. Please see specific information pulled into the AVS (placed there by myself) if appropriate.    Return in about 4 weeks (around 1/13/2023) for Recheck.    MDM  Number of Diagnoses or Management Options  Diarrhea, unspecified type: established, improving  Irritable bowel syndrome with diarrhea: established, worsening     Amount and/or Complexity of Data Reviewed  Clinical lab tests: reviewed  Tests in the radiology section of CPT®: reviewed  Tests in the medicine section of CPT®: reviewed  Discussion of test results with the performing providers: no  Decide to obtain previous medical records or to obtain history from someone other than the patient: no  Obtain history from someone other than the patient: yes  Review and summarize past medical records: yes  Discuss the patient with other providers: no  Independent visualization of images, tracings, or specimens: no    Risk of Complications, Morbidity, and/or Mortality  Presenting problems: high  Diagnostic procedures: minimal  Management options: high    Critical Care  Total time providing critical care: 30-74 minutes    Patient Progress  Patient progress:  stable       Class 2 Severe Obesity (BMI >=35 and <=39.9). Obesity-related health conditions include the following: none. Obesity is unchanged. BMI is is above average; BMI management plan is completed. We discussed portion control and increasing exercise.       RASHIDA Cheek, FNP-BC

## 2022-12-29 ENCOUNTER — HOSPITAL ENCOUNTER (OUTPATIENT)
Dept: CT IMAGING | Facility: HOSPITAL | Age: 22
Discharge: HOME | End: 2022-12-29

## 2022-12-29 ENCOUNTER — HOSPITAL ENCOUNTER (OUTPATIENT)
Dept: GENERAL RADIOLOGY | Facility: HOSPITAL | Age: 22
Discharge: HOME | End: 2022-12-29

## 2022-12-29 DIAGNOSIS — R10.84 GENERALIZED ABDOMINAL PAIN: ICD-10-CM

## 2022-12-29 DIAGNOSIS — K58.0 IRRITABLE BOWEL SYNDROME WITH DIARRHEA: ICD-10-CM

## 2022-12-29 PROCEDURE — 74176 CT ABD & PELVIS W/O CONTRAST: CPT

## 2022-12-29 PROCEDURE — 74018 RADEX ABDOMEN 1 VIEW: CPT
